# Patient Record
Sex: FEMALE | Race: WHITE | NOT HISPANIC OR LATINO | Employment: FULL TIME | URBAN - METROPOLITAN AREA
[De-identification: names, ages, dates, MRNs, and addresses within clinical notes are randomized per-mention and may not be internally consistent; named-entity substitution may affect disease eponyms.]

---

## 2018-10-21 ENCOUNTER — OFFICE VISIT (OUTPATIENT)
Dept: URGENT CARE | Facility: CLINIC | Age: 23
End: 2018-10-21
Payer: COMMERCIAL

## 2018-10-21 VITALS
BODY MASS INDEX: 18.13 KG/M2 | OXYGEN SATURATION: 98 % | SYSTOLIC BLOOD PRESSURE: 150 MMHG | HEART RATE: 72 BPM | RESPIRATION RATE: 16 BRPM | HEIGHT: 66 IN | TEMPERATURE: 97.9 F | DIASTOLIC BLOOD PRESSURE: 100 MMHG | WEIGHT: 112.8 LBS

## 2018-10-21 DIAGNOSIS — K04.7 DENTAL ABSCESS: ICD-10-CM

## 2018-10-21 PROCEDURE — 99203 OFFICE O/P NEW LOW 30 MIN: CPT | Performed by: PHYSICIAN ASSISTANT

## 2018-10-21 RX ORDER — DEXAMETHASONE 4 MG/1
8 TABLET ORAL DAILY
Qty: 4 TAB | Refills: 0 | Status: SHIPPED | OUTPATIENT
Start: 2018-10-21 | End: 2018-10-23

## 2018-10-21 RX ORDER — AMOXICILLIN 875 MG/1
875 TABLET, COATED ORAL 2 TIMES DAILY
COMMUNITY

## 2018-10-21 RX ORDER — CLINDAMYCIN HYDROCHLORIDE 300 MG/1
300 CAPSULE ORAL 3 TIMES DAILY
Qty: 21 CAP | Refills: 0 | Status: SHIPPED | OUTPATIENT
Start: 2018-10-21 | End: 2018-10-28

## 2018-10-21 ASSESSMENT — ENCOUNTER SYMPTOMS
NEUROLOGICAL NEGATIVE: 1
CONSTITUTIONAL NEGATIVE: 1
FEVER: 0
MUSCULOSKELETAL NEGATIVE: 1
SORE THROAT: 0
SWOLLEN GLANDS: 0

## 2018-10-21 NOTE — LETTER
October 21, 2018         Patient: Danika Middleton   YOB: 1995   Date of Visit: 10/21/2018           To Whom it May Concern:    Danika Middleton was seen in my clinic on 10/21/2018 for illness; please excuse today.     If you have any questions or concerns, please don't hesitate to call.        Sincerely,           Yosef Moulton P.A.-C.  Electronically Signed

## 2018-10-21 NOTE — PROGRESS NOTES
Subjective:      Danika Middleton is a 23 y.o. female who presents with No chief complaint on file.            Other   This is a new problem. The current episode started yesterday (gum swelling/inflamm). The problem occurs constantly. The problem has been unchanged. Pertinent negatives include no fever, sore throat or swollen glands. The symptoms are aggravated by drinking and eating. She has tried rest for the symptoms. The treatment provided no relief.       Review of Systems   Constitutional: Negative.  Negative for fever.   HENT: Negative.  Negative for sore throat.    Musculoskeletal: Negative.    Skin: Negative.    Neurological: Negative.           Objective:     There were no vitals taken for this visit.     Physical Exam   Constitutional: She is oriented to person, place, and time. She appears well-developed and well-nourished. No distress.   HENT:   Head: Normocephalic and atraumatic.   Mouth/Throat: No oropharyngeal exudate.   Eyes: Pupils are equal, round, and reactive to light. EOM are normal.   Neck: Normal range of motion. Neck supple.   Lymphadenopathy:     She has no cervical adenopathy.   Neurological: She is alert and oriented to person, place, and time.   Skin: Skin is warm and dry.   Psychiatric: She has a normal mood and affect. Her behavior is normal.   Nursing note and vitals reviewed.    Active Ambulatory Problems     Diagnosis Date Noted   • No Active Ambulatory Problems     Resolved Ambulatory Problems     Diagnosis Date Noted   • No Resolved Ambulatory Problems     No Additional Past Medical History     No current outpatient prescriptions on file prior to visit.     No current facility-administered medications on file prior to visit.      Social History     Social History   • Marital status:      Spouse name: N/A   • Number of children: N/A   • Years of education: N/A     Occupational History   • Not on file.     Social History Main Topics   • Smoking status: Not on file   •  Smokeless tobacco: Not on file   • Alcohol use Not on file   • Drug use: Unknown   • Sexual activity: Not on file     Other Topics Concern   • Not on file     Social History Narrative   • No narrative on file     History reviewed. No pertinent family history.  Patient has no allergy information on record.            Assessment/Plan:     ·  gingivitis      · rx meds; MBX prn

## 2020-11-10 ENCOUNTER — HOSPITAL ENCOUNTER (OUTPATIENT)
Dept: LAB | Facility: MEDICAL CENTER | Age: 25
End: 2020-11-10
Attending: PHYSICIAN ASSISTANT
Payer: COMMERCIAL

## 2020-11-10 LAB
BASOPHILS # BLD AUTO: 0.7 % (ref 0–1.8)
BASOPHILS # BLD: 0.04 K/UL (ref 0–0.12)
EOSINOPHIL # BLD AUTO: 0.09 K/UL (ref 0–0.51)
EOSINOPHIL NFR BLD: 1.7 % (ref 0–6.9)
ERYTHROCYTE [DISTWIDTH] IN BLOOD BY AUTOMATED COUNT: 48.6 FL (ref 35.9–50)
HCT VFR BLD AUTO: 43 % (ref 37–47)
HGB BLD-MCNC: 14.2 G/DL (ref 12–16)
IMM GRANULOCYTES # BLD AUTO: 0.01 K/UL (ref 0–0.11)
IMM GRANULOCYTES NFR BLD AUTO: 0.2 % (ref 0–0.9)
LYMPHOCYTES # BLD AUTO: 1.65 K/UL (ref 1–4.8)
LYMPHOCYTES NFR BLD: 30.7 % (ref 22–41)
MCH RBC QN AUTO: 34.5 PG (ref 27–33)
MCHC RBC AUTO-ENTMCNC: 33 G/DL (ref 33.6–35)
MCV RBC AUTO: 104.6 FL (ref 81.4–97.8)
MONOCYTES # BLD AUTO: 0.56 K/UL (ref 0–0.85)
MONOCYTES NFR BLD AUTO: 10.4 % (ref 0–13.4)
NEUTROPHILS # BLD AUTO: 3.03 K/UL (ref 2–7.15)
NEUTROPHILS NFR BLD: 56.3 % (ref 44–72)
NRBC # BLD AUTO: 0 K/UL
NRBC BLD-RTO: 0 /100 WBC
PLATELET # BLD AUTO: 227 K/UL (ref 164–446)
PMV BLD AUTO: 9.9 FL (ref 9–12.9)
RBC # BLD AUTO: 4.11 M/UL (ref 4.2–5.4)
T4 FREE SERPL-MCNC: 0.97 NG/DL (ref 0.93–1.7)
TSH SERPL DL<=0.005 MIU/L-ACNC: 0.64 UIU/ML (ref 0.38–5.33)
WBC # BLD AUTO: 5.4 K/UL (ref 4.8–10.8)

## 2020-11-10 PROCEDURE — 84443 ASSAY THYROID STIM HORMONE: CPT

## 2020-11-10 PROCEDURE — 84439 ASSAY OF FREE THYROXINE: CPT

## 2020-11-10 PROCEDURE — 85025 COMPLETE CBC W/AUTO DIFF WBC: CPT

## 2020-11-10 PROCEDURE — 36415 COLL VENOUS BLD VENIPUNCTURE: CPT

## 2024-08-05 ENCOUNTER — HOSPITAL ENCOUNTER (EMERGENCY)
Facility: MEDICAL CENTER | Age: 29
End: 2024-08-05
Attending: EMERGENCY MEDICINE
Payer: COMMERCIAL

## 2024-08-05 VITALS
DIASTOLIC BLOOD PRESSURE: 65 MMHG | WEIGHT: 115 LBS | SYSTOLIC BLOOD PRESSURE: 124 MMHG | RESPIRATION RATE: 16 BRPM | OXYGEN SATURATION: 96 % | BODY MASS INDEX: 20.38 KG/M2 | HEIGHT: 63 IN | TEMPERATURE: 99.9 F | HEART RATE: 101 BPM

## 2024-08-05 DIAGNOSIS — F32.2 CURRENT SEVERE EPISODE OF MAJOR DEPRESSIVE DISORDER WITHOUT PSYCHOTIC FEATURES, UNSPECIFIED WHETHER RECURRENT (HCC): ICD-10-CM

## 2024-08-05 DIAGNOSIS — R45.851 SUICIDAL IDEATION: ICD-10-CM

## 2024-08-05 DIAGNOSIS — T50.902A INTENTIONAL DRUG OVERDOSE, INITIAL ENCOUNTER (HCC): ICD-10-CM

## 2024-08-05 LAB
AMPHET UR QL SCN: NEGATIVE
BARBITURATES UR QL SCN: NEGATIVE
BENZODIAZ UR QL SCN: NEGATIVE
BZE UR QL SCN: POSITIVE
CANNABINOIDS UR QL SCN: POSITIVE
EKG IMPRESSION: NORMAL
FENTANYL UR QL: NEGATIVE
METHADONE UR QL SCN: NEGATIVE
OPIATES UR QL SCN: NEGATIVE
OXYCODONE UR QL SCN: NEGATIVE
PCP UR QL SCN: NEGATIVE
POC BREATHALIZER: 0.1 PERCENT (ref 0–0.01)
PROPOXYPH UR QL SCN: NEGATIVE

## 2024-08-05 PROCEDURE — 93005 ELECTROCARDIOGRAM TRACING: CPT | Performed by: EMERGENCY MEDICINE

## 2024-08-05 PROCEDURE — 302970 POC BREATHALIZER: Performed by: EMERGENCY MEDICINE

## 2024-08-05 PROCEDURE — 90791 PSYCH DIAGNOSTIC EVALUATION: CPT

## 2024-08-05 PROCEDURE — 302970 POC BREATHALIZER

## 2024-08-05 PROCEDURE — 99285 EMERGENCY DEPT VISIT HI MDM: CPT

## 2024-08-05 PROCEDURE — 80307 DRUG TEST PRSMV CHEM ANLYZR: CPT

## 2024-08-05 NOTE — DISCHARGE PLANNING
Legal Hold Transfer     Referral: Legal hold transfer to Mental Health Facility        Pt's accepting physcian is Dr. Haro     Transport arranged through [facility] at [facility]     The pt will be picked up at 1700      Notified Bedside JAVED HOWELL, Alert Team JAVED Pulliam, and Dr. Pink of the departure time as well as accepting facility.      Transfer packet to be created with original legal hold and placed on chart.      Plan: Transfer to [facility] at 1700

## 2024-08-05 NOTE — DISCHARGE PLANNING
Yeni at [facility] called to accept pt for 1700. Accepting MD is Dr. Haro. Alert Team to arrange transfer.

## 2024-08-05 NOTE — ED NOTES
Report received from off going RN/tech: Chante, assumed care of patient.  all needs addressed at this time.     Pt in appropriate L2K scrubs. Stop sign at doorway. Questions answered to sitter. Sitter remains 1:1.  Patient remains comfortable on gurney, no identifiable needs at this time. Equal chest rise and fall bilaterally    Fall risk interventions in place: Not Applicable (all applicable per [facility] Fall risk assessment)   Continuous monitoring: Not Applicable   IVF/IV medications: Not Applicable   Oxygen: Room Air  Bedside sitter: Pt on L2k SI with 1:1 sitter Martina (name)  Isolation: Not Applicable

## 2024-08-05 NOTE — ED TRIAGE NOTES
"Chief Complaint   Patient presents with    Suicide Attempt     Patient reports taking 15 Remeron after drinking alcohol. PAtient reports taking them some time after coming home from the [place] after 0200. Patient states she just wanted to sleep. Patient's mom told police she states \"I don;t want to do this anymore\" due to a breakup. Patient denies SI now.      Bruising present to patients arms. Patient states \"I don't want to talk about it. GCS 15  "

## 2024-08-05 NOTE — DISCHARGE PLANNING
Alert Team Note:     Contacted Jam at [facility] regarding pt's referral. Was advised that referral was received and pending review. No beds available at this time. Will be notified when beds become available.     Contacted intake at [facility], was advised that referral was received and under review.

## 2024-08-05 NOTE — ED NOTES
Pt resting on gurney, respirations even and unlabored.  1:1 Observation:  Continuous visual monitoring by Trained Personnel who remain in line of site of the patient with the intent to ensure safety of the patient and minimize the risk of suicide.

## 2024-08-05 NOTE — ED PROVIDER NOTES
"ER Provider Note    Scribed for Carlos Pink M.d. by Carlos Pink M.D.. 8/5/2024  7:54 AM    Primary Care Provider: Xu Rivera M.D.    CHIEF COMPLAINT  Chief Complaint   Patient presents with    Suicide Attempt     Patient reports taking 15 Remeron after drinking alcohol. PAtient reports taking them some time after coming home from the [place] after 0200. Patient states she just wanted to sleep. Patient's mom told police she states \"I don;t want to do this anymore\" due to a breakup. Patient denies SI now.      EXTERNAL RECORDS REVIEWED  Outside records show history of generalized anxiety disorder.    HPI/ROS  LIMITATION TO HISTORY   Patient unwilling to answer questions about her intent, talk about her reported break-up, will really give much in the way of useful information at this time.  Crying, upset, providing very little information.    OUTSIDE HISTORIAN(S):  EMS and legal hold give additional information.    Danika Middleton is a 29 y.o. female who presents to the ED after her mother called PD because the patient took 15 Remeron and etoh. Drinking was [place]. Seh took the Remeron at home. She reports just wanting to sleep, denies SI. Pt's mother reported vague SI statements to PD, including pt stating \"I don't want to do this anymore.\"  The patient herself is crying, upset, not wanting to talk about her intent with respect to drinking and pill ingestion, denies suicidality, but is obviously depressed.  Has bruises on her arms, which she also states she does not want to talk about.  Does not complain of any trauma or recent illness.    PAST MEDICAL HISTORY  History reviewed. No pertinent past medical history.    SURGICAL HISTORY  History reviewed. No pertinent surgical history.    FAMILY HISTORY  History reviewed. No pertinent family history.    SOCIAL HISTORY   reports that she has been smoking. She has never used smokeless tobacco. She reports current alcohol use. She " "reports current drug use.    CURRENT MEDICATIONS  Previous Medications    AMOXICILLIN (AMOXIL) 875 MG TABLET    Take 875 mg by mouth 2 times a day.       ALLERGIES  Sulfa drugs    PHYSICAL EXAM  VITAL SIGNS: /65   Pulse (!) 101   Temp 37.7 °C (99.9 °F)   Resp 16   Ht 1.6 m (5' 3\")   Wt 52.2 kg (115 lb)   SpO2 96%   BMI 20.37 kg/m²   Pulse ox interpretation: I interpret this pulse ox as normal.  Constitutional: Alert in no apparent distress.  HENT: No signs of trauma, Bilateral external ears normal, Nose normal.   Eyes: Pupils are equal and reactive, Conjunctiva normal, Non-icteric.   Neck: Normal range of motion, Supple, No stridor.    Cardiovascular: Normal peripheral perfusion.  Tachycardic on arrival.  Thorax & Lungs: Unlabored respirations, equal chest expansion, no accessory muscle use  Abdomen: Non-distended  Skin:  No erythema, No rash.   Back: Normal alignment and ROM  Extremities: No gross deformity  Musculoskeletal: Good range of motion in all major joints.   Neurologic: Alert, Normal motor function, No focal deficits noted.   Psychiatric: Affect depressed, anxious, denies suicidality, difficult to assess judgment or mood, as patient is no unwilling to answer questions.      DIAGNOSTIC STUDIES    Labs:   Labs Reviewed   URINE DRUG SCREEN - Abnormal; Notable for the following components:       Result Value    Cocaine Metabolite Positive (*)     Cannabinoid Metab Positive (*)     All other components within normal limits   POC BREATHALIZER - Abnormal; Notable for the following components:    POC Breathalizer 0.1 (*)     All other components within normal limits       EKG:   I have independently interpreted this EKG  Results for orders placed or performed during the hospital encounter of 08/05/24   EKG   Result Value Ref Range    Report       [facility] Emergency Dept.    Test Date:  2024-08-05  Pt Name:    EFRAÍN TREVINO              Department: ER  MRN:        [id number]       " "               Room:       [facility]  Gender:     Female                       Technician: [id number]  :        1995                   Requested By:LILIANE SHAW  Order #:    [id number]                    Reading MD: LILIANE SHAW MD    Measurements  Intervals                                Axis  Rate:       112                          P:          69  NY:         114                          QRS:        48  QRSD:       80                           T:          60  QT:         320  QTc:        437    Interpretive Statements  Sinus tachycardia  No previous ECG available for comparison  Electronically Signed On 2024 08:42:30 PDT by LILIANE SHAW MD           COURSE & MEDICAL DECISION MAKING     INITIAL ASSESSMENT, COURSE AND PLAN  Care Narrative:     Overdose: Ms. Middleton was here with a suspected overdose of Remeron and etoh she has no other known overdoses.      7:54 AM Patient presents to the ED with intentional ingestion of 15 Remeron tablets.  This was greater than 6 hours ago.  She was also drinking alcohol.  Elements of her history are concerning for her obvious depression with possible suicidality, with a known history of generalized anxiety disorder, a reported recent break-up, and vague statements to her mother such as \"I do not want to do this anymore.\"  Although the patient denies suicidality at this time, she is very limited in the information she is willing to provide, and is already on a legal hold, which I think is for good reason, given her current state of mind and refusal to discuss the events leading to this visit and her intentions and feelings.  She will be maintained on legal hold until alert team evaluation.. Per triage protocol, urine drug screen and breathalyzer was ordered. Patient evaluated at bedside and discussed plan of care, including EKG, given Remeron's potential for QT prolongation, though it has already been greater than 6 hours since the reported ingestion.  " Differential diagnosis includes depression, Remeron overdose, alcohol intoxication, suicidal thoughts.    MEASURES: Due to diagnostic uncertainty, need for psychosocial evaluation, presence of a legal hold, this patient is placed on ED observation as of 7:54 AM.  Observation plan is to ensure medical stability with respect to her reported Remeron and alcohol ingestion, establishment of an appropriate plan of psychiatric care.    3:59 PM This patient has been accepted to [facility], with the accepting physician Dr. Haro.  Transfer time estimated at 1700 hrs.  Until she is actually transported, her care will be transferred to my partner Dr. Cortes.      ADDITIONAL PROBLEM MANAGED  Situational depression from recent break-up.    DISPOSITION AND DISCUSSIONS  I have discussed management of the patient with the following physicians and GIUSEPPE's: Dr. Cortes, my partner in the emergency department.    Discussion of management with other [facility] or appropriate source(s): Lucy Cruz RN, alert team.    Escalation of care considered, and ultimately not performed: acute inpatient care management, however at this time, the patient is most appropriate for outpatient management.  Rather than inpatient care here, should be transferred to inpatient psychiatry.      Decision tools and prescription drugs considered including, but not limited to: Medication modification considered, but will be deferred to inpatient psychiatric treatment .    DISPOSITION:  Patient will be admitted to [facility] in guarded condition.      FINAL DIAGNOSIS  1. Intentional drug overdose, initial encounter ([facility])    2. Suicidal ideation    3. Current severe episode of major depressive disorder without psychotic features, unspecified whether recurrent ([facility])            Carlos ROSAS M.D. (Scribe), am scribing for, and in the presence of, Carlos Pink M.D..    Electronically signed by: Carlos Pink M.D. (Scribe), 8/5/2024    Carlos ROSAS  TIN Pink personally performed the services described in this documentation, as scribed by Carlos Pink M.D. in my presence, and it is both accurate and complete.

## 2024-08-05 NOTE — CONSULTS
"[facility]   TRIAGE ASSESSMENT    Name: Danika Middleton  MRN: [id number]  : 1995  Age: 29 y.o.  Date of assessment: 2024  PCP: Xu Rivera M.D.  Persons in attendance: Patient  Patient Location: [facility]    CHIEF COMPLAINT/PRESENTING ISSUE (as stated by patient): Pt BIB EMS following an intentional overdose. Pt states she has multiple stressors, including her current boyfriend moving to [place] and her ex- having two kids with a different person. Pt is stressed about finances, specifically about the cost of the ED visit. As a result, pt is angry, crying and denying suicidal ideation, stating it is illegal for us to keep her here. Guarded, not disclosing history. Current taking meds for anxiety prescribed by a PCP but is unsure of the names. Denies being engaged in mental health counseling. Legal hold has been certified, pt will benefit greatly from further evaluation in an inpatient setting.  Chief Complaint   Patient presents with    Suicide Attempt     Patient reports taking 15 Remeron after drinking alcohol. PAtient reports taking them some time after coming home from the [place] after 0200. Patient states she just wanted to sleep. Patient's mom told police she states \"I don;t want to do this anymore\" due to a breakup. Patient denies SI now.         CURRENT LIVING SITUATION/SOCIAL SUPPORT/FINANCIAL RESOURCES:  Employed at the [employer]. Mom lives in the area and is supportive.     BEHAVIORAL HEALTH/SUBSTANCE USE TREATMENT HISTORY  Does patient/parent report a history of prior behavioral health/substance use treatment for patient?   Yes:    Dates Level of Care Facilty/Provider Diagnosis/Problem Medications   Current Outpatient PCP  [facility] unknown                                                                      SAFETY ASSESSMENT - SELF  Does patient acknowledge current or past symptoms of dangerousness to self or is previous " history noted? yes  Does parent/significant other report patient has current or past symptoms of dangerousness to self? yes  Does presenting problem suggest symptoms of dangerousness to self? Yes:     Past Current    Suicidal Thoughts: []  [x]    Suicidal Plans: []  [x]    Suicidal Intent: []  [x]    Suicide Attempts: []  [x]    Self-Injury []  []      For any boxes checked above, provide detail: Suicide attempt via OD on medication and alcohol. Denies past attempts.    History of suicide by family member: unknown  History of suicide by friend/significant other:unknown  Recent change in frequency/specificity/intensity of suicidal thoughts or self-harm behavior? yes   Current access to firearms, medications, or other identified means of suicide/self-harm? Yes, has access to medications  If yes, willing to restrict access to means of suicide/self-harm? yes  Protective factors present:  Future-oriented and Strong family connections    SAFETY ASSESSMENT - OTHERS  Does patient acknowledge current or past symptoms of aggressive behavior or risk to others or is previous history noted? no  Does parent/significant other report patient has current or past symptoms of aggressive behavior or risk to others?  N\A  Does presenting problem suggest symptoms of dangerousness to others? No    LEGAL HISTORY  Does patient acknowledge history of arrest/retirement/[facility] or is previous history noted? unknown    Crisis Safety Plan completed and copy given to patient? N\A    ABUSE/NEGLECT SCREENING  Does patient report feeling “unsafe” in his/her home, or afraid of anyone?  unknown  Does patient report any history of physical, sexual, or emotional abuse?  unknown  Does parent or significant other report any of the above? N\A  Is there evidence of neglect by self?  no  Is there evidence of neglect by a caregiver? no  Does the patient/parent report any history of CPS/APS/police involvement related to suspected abuse/neglect or domestic violence?  "no  Based on the information provided during the current assessment, is a mandated report of suspected abuse/neglect being made?  No    SUBSTANCE USE SCREENING  Yes:  Won all substances used in the past 30 days:      Last Use Amount   [x]   Alcohol 8/5/24 unknown   [x]   Marijuana unknown    []   Heroin     []   Prescription Opioids  (used without prescription, for    recreation, or in excess of prescribed amount)     []   Other Prescription  (used without prescription, for    recreation, or in excess of prescribed amount)     [x]   Cocaine unknown     []   Methamphetamine     []   \"\" drugs (ectasy, MDMA)     []   Other substances        UDS results: positive for marijuana, cocaine  Breathalyzer results: pending    MENTAL STATUS   Participation: Active verbal participation, Defensive, and Resistant  Grooming: Poor  Orientation: Alert and Fully Oriented  Behavior: Agitated  Eye contact: Limited  Mood: Angry and Irritable  Affect: Tearful and Angry  Thought process: Logical and Goal-directed  Thought content: Within normal limits  Speech: Rate within normal limits and Volume within normal limits  Perception: Within normal limits  Memory:  Recent:  Limited  Insight: Limited  Judgment:  Limited  Other:    Collateral information:    Source:  [] Significant other present in person:   [] Significant other by telephone  [] [facility]   [] [facility] Nursing Staff  [] [facility]  [] Other:     [] Unable to complete full assessment due to:  [] Acute intoxication  [] Patient declined to participate/engage  [] Patient verbally unresponsive  [] Significant cognitive deficits  [] Significant perceptual distortions or behavioral disorganization  [] Other:      CLINICAL IMPRESSIONS:  Primary:  suicidal ideation  Secondary:  per EMR: [facility]       IDENTIFIED NEEDS/PLAN:  [Trigger DISPOSITION list for any items marked]    [x]  Imminent safety risk - self [] Imminent safety risk - others   []  Acute substance " withdrawal []  Psychosis/Impaired reality testing   []  Mood/anxiety [x]  Substance use/Addictive behavior   []  Maladaptive behaviro []  Parent/child conflict   []  Family/Couples conflict []  Biomedical   []  Housing []  Financial   []   Legal  Occupational/Educational   []  Domestic violence []  Other:     Recommended Plan of Care:  Actively being addressed by Legal [facility] Emergency Department, Refer to [facility], [facility], and [place], and 1:1 Observation No personal phone, no visitors, no personal items. May use facility phone for brief calls with direct supervision.   *Telesitter may not be utilized for moderate or high risk patients    Has the Recommended Plan of Care/Level of Observation been reviewed with the patient's assigned nurse? yes    Does patient/parent or guardian express agreement with the above plan? no    Referral appointment(s) scheduled? N\A    Alert team only:   I have discussed findings and recommendations with Dr. Pink who is in agreement with these recommendations.     Referral information sent to the following outpatient community providers :    Referral information sent to the following inpatient community providers : [facility], [facility], [facility]    If applicable : Referred  to  Alert Team for legal hold follow up at (time): 8/8/24      Lucy Cruz R.N.  8/5/2024

## 2024-08-05 NOTE — DISCHARGE PLANNING
[facility] ED Behavioral Health Fax Referral      Referral: Legal Hold    Intervention: Patient referral to community inpatient  facillity    Legal Hold Initiated: Date: 08/05/2024 Time: 0701    Patient’s Insurance Listed on Face Sheet: [employer]    Referrals sent to: [facility] and [facility]     Referrals faxed by Karol FRIEDMAN    This referral contains the following information:  Face sheet _x___  Page 1 and Page 2 of Legal Hold __x__  Alert Team Assessment/Psych Assessment _x___  Head to toe physical exam ____  Urine Drug Screen __x__  Blood Alcohol __x__  Vital signs __x__  Pregnancy test when applicable ___  Medications list ____  Covid screening ____    Plan: Patient will transfer to mental health facility once acceptance is obtained    For all referral information, please contact the  referral office daily between the hours of 7:00 a.m. to 6:00 p.m. at:   Phone [phone]   Fax [phone]    ED  Alert Team   Phone [phone] Fax [phone]    [facility] Emergency Department Contact numbers:  Green Pod [phone]   Blue Pod [phone]   Red Pod [phone]   Pediatrics [phone]

## 2024-08-05 NOTE — ED NOTES
.Patient remains comfortable on gurney, no identifiable needs at this time. Equal chest rise and fall bilaterally. Safety measures in place, sitter 1:1 in direct sight of pt

## 2024-08-05 NOTE — ED NOTES
.Patient remains comfortable on gurney, no identifiable needs at this time. Equal chest rise and fall bilaterally. Safety measures in place, sitter 1:1 in direct view of pt

## 2024-08-05 NOTE — ED NOTES
Patient remains comfortable on gurney, no identifiable needs at this time. Equal chest rise and fall bilaterally. Safety measures in place, sitter 1:1  Lunch provided to pt

## 2024-08-06 NOTE — ED NOTES
Patient remains comfortable on gurney, no identifiable needs at this time. Equal chest rise and fall bilaterally. Safety measures in place, sitter 1:1 .

## 2024-08-06 NOTE — ED NOTES
Patient remains comfortable on gurney, no identifiable needs at this time. Equal chest rise and fall bilaterally. Safety measures in place, sitter 1:1.

## 2024-08-06 NOTE — ED NOTES
Pt stable for transfer. Pt and REMSA educated and reviewed  instructions with RN. Pt & REMSA verbalized understanding & all questions were answered. Pt AoX 4. Pt ambulated independently with balanced and steady gait out of the ED with NATCAHA to transfer to [facility]. NATACHA in custody of pt belongings.   Paperwork with NATACHA

## 2025-03-16 ENCOUNTER — APPOINTMENT (OUTPATIENT)
Dept: RADIOLOGY | Facility: IMAGING CENTER | Age: 30
End: 2025-03-16
Attending: NURSE PRACTITIONER
Payer: MEDICAID

## 2025-03-16 ENCOUNTER — OFFICE VISIT (OUTPATIENT)
Dept: URGENT CARE | Facility: CLINIC | Age: 30
End: 2025-03-16
Payer: MEDICAID

## 2025-03-16 VITALS
SYSTOLIC BLOOD PRESSURE: 112 MMHG | TEMPERATURE: 98 F | OXYGEN SATURATION: 98 % | WEIGHT: 120.5 LBS | HEART RATE: 88 BPM | DIASTOLIC BLOOD PRESSURE: 62 MMHG | BODY MASS INDEX: 20.57 KG/M2 | RESPIRATION RATE: 18 BRPM | HEIGHT: 64 IN

## 2025-03-16 DIAGNOSIS — T74.91XA DOMESTIC VIOLENCE OF ADULT, INITIAL ENCOUNTER: ICD-10-CM

## 2025-03-16 DIAGNOSIS — S46.911A STRAIN OF RIGHT SHOULDER, INITIAL ENCOUNTER: ICD-10-CM

## 2025-03-16 DIAGNOSIS — M62.838 NECK MUSCLE SPASM: ICD-10-CM

## 2025-03-16 PROCEDURE — 3074F SYST BP LT 130 MM HG: CPT | Performed by: NURSE PRACTITIONER

## 2025-03-16 PROCEDURE — 99204 OFFICE O/P NEW MOD 45 MIN: CPT | Performed by: NURSE PRACTITIONER

## 2025-03-16 PROCEDURE — 73030 X-RAY EXAM OF SHOULDER: CPT | Mod: TC,RT | Performed by: RADIOLOGY

## 2025-03-16 PROCEDURE — 3078F DIAST BP <80 MM HG: CPT | Performed by: NURSE PRACTITIONER

## 2025-03-16 RX ORDER — METHYLPREDNISOLONE 4 MG/1
TABLET ORAL
Qty: 21 TABLET | Refills: 0 | Status: SHIPPED | OUTPATIENT
Start: 2025-03-16

## 2025-03-16 ASSESSMENT — ENCOUNTER SYMPTOMS
TINGLING: 0
COUGH: 0
MYALGIAS: 1
MUSCLE WEAKNESS: 0
FEVER: 0
CHILLS: 0

## 2025-03-16 ASSESSMENT — FIBROSIS 4 INDEX: FIB4 SCORE: 0.59
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
